# Patient Record
Sex: FEMALE | Race: BLACK OR AFRICAN AMERICAN | NOT HISPANIC OR LATINO | URBAN - METROPOLITAN AREA
[De-identification: names, ages, dates, MRNs, and addresses within clinical notes are randomized per-mention and may not be internally consistent; named-entity substitution may affect disease eponyms.]

---

## 2019-10-29 ENCOUNTER — APPOINTMENT (RX ONLY)
Dept: URBAN - METROPOLITAN AREA OTHER 10 | Facility: OTHER | Age: 49
Setting detail: DERMATOLOGY
End: 2019-10-29

## 2019-10-29 DIAGNOSIS — Z41.9 ENCOUNTER FOR PROCEDURE FOR PURPOSES OTHER THAN REMEDYING HEALTH STATE, UNSPECIFIED: ICD-10-CM

## 2019-10-29 DIAGNOSIS — L81.0 POSTINFLAMMATORY HYPERPIGMENTATION: ICD-10-CM

## 2019-10-29 PROCEDURE — ? COUNSELING

## 2019-10-29 PROCEDURE — ? TREATMENT REGIMEN

## 2019-10-29 PROCEDURE — 99213 OFFICE O/P EST LOW 20 MIN: CPT

## 2019-10-29 PROCEDURE — ? PRESCRIPTION

## 2019-10-29 RX ORDER — PHARMACY COMPOUNDING ACCESSORY
EACH MISCELLANEOUS
Qty: 1 | Refills: 3 | Status: ERX | COMMUNITY
Start: 2019-10-29

## 2019-10-29 RX ORDER — HYDROQUINONE 4 %
CREAM (GRAM) TOPICAL
Qty: 30 | Refills: 2 | Status: ERX | COMMUNITY
Start: 2019-10-29

## 2019-10-29 RX ADMIN — Medication: at 15:23

## 2019-10-29 RX ADMIN — Medication: at 15:24

## 2019-10-29 ASSESSMENT — LOCATION ZONE DERM: LOCATION ZONE: TRUNK

## 2019-10-29 ASSESSMENT — LOCATION SIMPLE DESCRIPTION DERM: LOCATION SIMPLE: ABDOMEN

## 2019-10-29 ASSESSMENT — LOCATION DETAILED DESCRIPTION DERM: LOCATION DETAILED: PERIUMBILICAL SKIN

## 2019-10-29 NOTE — PROCEDURE: TREATMENT REGIMEN
Otc Regimen: Neutrogena with retinol MARK mixed with otc hydrocortisone HQ 4% under eyes
Detail Level: Zone

## 2019-10-29 NOTE — HPI: SCAR (COMPLEX), BREAST
How Severe Is The Scar?: mild
What Concerns You About Your Scar? (Check All That Apply): discoloration
Is This A New Presentation, Or A Follow-Up?: Breast Scar
Date Of Surgery: 2015

## 2021-05-26 ENCOUNTER — APPOINTMENT (RX ONLY)
Dept: URBAN - METROPOLITAN AREA CLINIC 38 | Facility: CLINIC | Age: 51
Setting detail: DERMATOLOGY
End: 2021-05-26

## 2021-05-26 ENCOUNTER — APPOINTMENT (RX ONLY)
Dept: URBAN - METROPOLITAN AREA CLINIC 37 | Facility: CLINIC | Age: 51
Setting detail: DERMATOLOGY
End: 2021-05-26

## 2021-05-26 DIAGNOSIS — L30.9 DERMATITIS, UNSPECIFIED: ICD-10-CM

## 2021-05-26 PROCEDURE — ? TREATMENT REGIMEN

## 2021-05-26 PROCEDURE — ? COUNSELING

## 2021-05-26 PROCEDURE — 99202 OFFICE O/P NEW SF 15 MIN: CPT

## 2021-05-26 PROCEDURE — ? ORDER TESTS

## 2021-05-26 PROCEDURE — ? PRESCRIPTION

## 2021-05-26 PROCEDURE — ? PHOTO-DOCUMENTATION

## 2021-05-26 RX ORDER — MUPIROCIN 20 MG/G
OINTMENT TOPICAL BID
Qty: 1 | Refills: 1 | Status: ERX | COMMUNITY
Start: 2021-05-26

## 2021-05-26 RX ORDER — TRIAMCINOLONE ACETONIDE 0.25 MG/G
CREAM TOPICAL BID
Qty: 1 | Refills: 1 | Status: ERX | COMMUNITY
Start: 2021-05-26

## 2021-05-26 RX ADMIN — TRIAMCINOLONE ACETONIDE THIN LAYER: 0.25 CREAM TOPICAL at 00:00

## 2021-05-26 RX ADMIN — MUPIROCIN THIN LAYER: 20 OINTMENT TOPICAL at 00:00

## 2021-05-26 ASSESSMENT — LOCATION DETAILED DESCRIPTION DERM
LOCATION DETAILED: RIGHT POSTERIOR EAR
LOCATION DETAILED: RIGHT CENTRAL BUCCAL CHEEK
LOCATION DETAILED: RIGHT INFERIOR VERMILION LIP
LOCATION DETAILED: RIGHT CENTRAL BUCCAL CHEEK
LOCATION DETAILED: RIGHT INFERIOR VERMILION LIP
LOCATION DETAILED: RIGHT INFERIOR POSTAURICULAR SKIN
LOCATION DETAILED: RIGHT LOWER CUTANEOUS LIP
LOCATION DETAILED: RIGHT LOWER CUTANEOUS LIP
LOCATION DETAILED: RIGHT POSTERIOR EAR
LOCATION DETAILED: RIGHT INFERIOR POSTAURICULAR SKIN

## 2021-05-26 ASSESSMENT — LOCATION SIMPLE DESCRIPTION DERM
LOCATION SIMPLE: RIGHT LIP
LOCATION SIMPLE: RIGHT CHEEK
LOCATION SIMPLE: RIGHT LIP
LOCATION SIMPLE: RIGHT CHEEK
LOCATION SIMPLE: POSTERIOR SCALP
LOCATION SIMPLE: POSTERIOR SCALP
LOCATION SIMPLE: RIGHT EAR
LOCATION SIMPLE: RIGHT EAR

## 2021-05-26 ASSESSMENT — LOCATION ZONE DERM
LOCATION ZONE: FACE
LOCATION ZONE: LIP
LOCATION ZONE: SCALP
LOCATION ZONE: FACE
LOCATION ZONE: EAR
LOCATION ZONE: EAR
LOCATION ZONE: SCALP
LOCATION ZONE: LIP

## 2021-05-26 NOTE — HPI: RASH
What Type Of Note Output Would You Prefer (Optional)?: Standard Output
Is The Patient Presenting As Previously Scheduled?: Yes
How Severe Is Your Rash?: moderate
Is This A New Presentation, Or A Follow-Up?: Rash
Additional History: She states that she is allergic to latex and saw the dentist before this broke out - a new person was helping the dentist - ?maybe came into contact with latex?  Also, she started the neosporin after the rash broke out.\\nShe stated that it started out on the right lower lip with a blister and she thought it might be a fever blister and her PCP called in Valtrex (she has never had a history of fever blisters).  It progressively started moving down to the lower cutaneous lip and she also has some bumps and itching and redness behind the right ear.\\nShe says they itch but don't \"hurt\".

## 2021-05-26 NOTE — PROCEDURE: TREATMENT REGIMEN
Initiate Treatment: Triamcinolone 0.025% cream to affected areas BID x7 days\\nMupirocin ointment to affected areas BID x7 days (can mix meds together)
Detail Level: Zone

## 2021-05-26 NOTE — PROCEDURE: ORDER TESTS
Expected Date Of Service: 05/26/2021
Billing Type: Third-Party Bill
Performing Laboratory: [id number]
Bill For Surgical Tray: no
Lab Facility: [id number]

## 2021-05-26 NOTE — PROCEDURE: COUNSELING
Detail Level: Zone
Patient Specific Counseling (Will Not Stick From Patient To Patient): The rash looks like a contact dermatitis - not related to neosporin as she applied after the rash and it did not make the rash worse.  May be related to the dental visit.  She has completed one course of Valtrex - doubt this is HSV.  Bacterial culture done and she was given topical steroid.  This does not look like bullous impetigo so no antbiotic treatment today - only steroid. Will call if this changes based on culture report.  She will return in one week or call if it worsens in spite of treatment.

## 2021-05-26 NOTE — PROCEDURE: COUNSELING
Patient Specific Counseling (Will Not Stick From Patient To Patient): The rash looks like a contact dermatitis - not related to neosporin as she applied after the rash and it did not make the rash worse.  May be related to the dental visit.  She has completed one course of Valtrex - doubt this is HSV.  Bacterial culture done and she was given topical steroid.  This does not look like bullous impetigo so no antbiotic treatment today - only steroid. Will call if this changes based on culture report.  She will return in one week or call if it worsens in spite of treatment.
Detail Level: Zone

## 2021-05-26 NOTE — PROCEDURE: PHOTO-DOCUMENTATION
Details (Free Text): face/lip rash
Photo Preface (Leave Blank If You Do Not Want): Photographs were obtained today of the
Detail Level: Simple

## 2021-05-26 NOTE — PROCEDURE: PHOTO-DOCUMENTATION
Detail Level: Simple
Photo Preface (Leave Blank If You Do Not Want): Photographs were obtained today of the
Details (Free Text): face/lip rash

## 2021-05-26 NOTE — PROCEDURE: TREATMENT REGIMEN
Detail Level: Zone
Initiate Treatment: Triamcinolone 0.025% cream to affected areas BID x7 days\\nMupirocin ointment to affected areas BID x7 days (can mix meds together)